# Patient Record
Sex: FEMALE | Race: WHITE | NOT HISPANIC OR LATINO | Employment: FULL TIME | URBAN - METROPOLITAN AREA
[De-identification: names, ages, dates, MRNs, and addresses within clinical notes are randomized per-mention and may not be internally consistent; named-entity substitution may affect disease eponyms.]

---

## 2023-04-11 ENCOUNTER — OFFICE VISIT (OUTPATIENT)
Dept: ENDOCRINOLOGY | Facility: CLINIC | Age: 28
End: 2023-04-11
Payer: COMMERCIAL

## 2023-04-11 VITALS
DIASTOLIC BLOOD PRESSURE: 66 MMHG | OXYGEN SATURATION: 98 % | HEART RATE: 78 BPM | SYSTOLIC BLOOD PRESSURE: 108 MMHG | HEIGHT: 66 IN | BODY MASS INDEX: 21.21 KG/M2 | WEIGHT: 132 LBS

## 2023-04-11 DIAGNOSIS — E06.3 HYPOTHYROIDISM DUE TO HASHIMOTO'S THYROIDITIS: Primary | ICD-10-CM

## 2023-04-11 DIAGNOSIS — E04.0 SIMPLE GOITER: ICD-10-CM

## 2023-04-11 DIAGNOSIS — E03.8 HYPOTHYROIDISM DUE TO HASHIMOTO'S THYROIDITIS: Primary | ICD-10-CM

## 2023-04-11 PROCEDURE — 99203 OFFICE O/P NEW LOW 30 MIN: CPT | Performed by: INTERNAL MEDICINE

## 2023-04-11 PROCEDURE — 84443 ASSAY THYROID STIM HORMONE: CPT | Performed by: INTERNAL MEDICINE

## 2023-04-11 PROCEDURE — 84439 ASSAY OF FREE THYROXINE: CPT | Performed by: INTERNAL MEDICINE

## 2023-04-11 RX ORDER — DEXTROAMPHETAMINE SACCHARATE, AMPHETAMINE ASPARTATE, DEXTROAMPHETAMINE SULFATE AND AMPHETAMINE SULFATE 2.5; 2.5; 2.5; 2.5 MG/1; MG/1; MG/1; MG/1
TABLET ORAL
COMMUNITY
Start: 2020-11-04

## 2023-04-11 RX ORDER — DIPHENOXYLATE HYDROCHLORIDE AND ATROPINE SULFATE 2.5; .025 MG/1; MG/1
TABLET ORAL DAILY
COMMUNITY

## 2023-04-11 NOTE — LETTER
April 23, 2023     Quentin Sullivan MD  [address] [facility] [address]  formerly [facility] [address]    Patient: Brandi Briseno   YOB: 1995   Date of Visit: 4/11/2023       Dear Dr. Nate MD:    Thank you for referring Brandi Briseno to me for evaluation. Below are the relevant portions of my assessment and plan of care.    If you have questions, please do not hesitate to call me. I look forward to following Brandi along with you.         Sincerely,        Keren Morris MD        CC: No Recipients    Keren Morris MD  04/23/23 0151  Sign when Signing Visit  Chief Complaint   Patient presents with   • Hypothyroidism     New Consult DIONISIO Sullivan       HPI:   Brandi Briseno is a 27 y.o.female sent in consultation by Quentin Sullivan MD for further evaluation of pt's hypothyroidism. Her history is as follows:    1) hypothyroidism due to Hashimoto's thyroiditis:  - Pt had a routine physical in 12/2022 and was found to have an elevated TSH of 8.27 (0.38 - 4.31) She was prescribed levothyroxine 75 mcg daily by her PCP but the patient did not start the medication. Pt's sister is followed by me for a h/o transient Graves disease and advised pt to see me in consultation for further evaluation.  - Pt denied any URI or other illness in the months prior to her abnormal TSH in 12/2022.    On further review, she reported occasional fatigue. Denied arthralgias or myalgias.  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  Is status post cholecystectomy in high school.    Review of Systems   Constitutional: Positive for fatigue (Occasional).   HENT: Negative.    Eyes: Negative.    Respiratory: Negative.    Cardiovascular: Negative.    Gastrointestinal: Positive for constipation (Occasional) and diarrhea (More frequent due to IBS).   Endocrine: Negative.    Genitourinary: Negative.    Musculoskeletal: Negative.  Negative for arthralgias, joint swelling and myalgias.   Skin: Negative.    Allergic/Immunologic: Negative.    Neurological:  "Negative.    Hematological: Negative.    Psychiatric/Behavioral: Negative.      Past Medical History:   Diagnosis Date   • ADD (attention deficit disorder)    • Hypothyroid      family history includes Cancer in her paternal aunt; Hypertension in her father; Thyroid disease in her maternal aunt, mother, and sister.  Past Surgical History:   Procedure Laterality Date   • CHOLECYSTECTOMY     • TONSILLECTOMY AND ADENOIDECTOMY     • WISDOM TOOTH EXTRACTION       Social History     Tobacco Use   • Smoking status: Never   • Smokeless tobacco: Never   Substance Use Topics   • Alcohol use: Yes     Comment: 1-2 weekly   • Drug use: Never     Outpatient Medications Prior to Visit   Medication Sig Dispense Refill   • amphetamine-dextroamphetamine (ADDERALL) 10 MG tablet      • Etonogestrel-Ethinyl Estradiol (NUVARING VA)      • multivitamin (MULTI-DAY PO) Take  by mouth Daily.       No facility-administered medications prior to visit.     No Known Allergies    /66   Pulse 78   Ht 166.4 cm (65.5\")   Wt 59.9 kg (132 lb)   SpO2 98%   BMI 21.63 kg/m²   Physical Exam  Vitals reviewed.   Constitutional:       General: She is not in acute distress.     Appearance: She is well-developed. She is not diaphoretic.   HENT:      Head: Normocephalic.   Eyes:      Conjunctiva/sclera: Conjunctivae normal.      Pupils: Pupils are equal, round, and reactive to light.   Neck:      Thyroid: Thyromegaly (mild) present.      Trachea: No tracheal deviation.      Comments: No palpable thyroid nodules    Cardiovascular:      Rate and Rhythm: Normal rate and regular rhythm.      Heart sounds: Normal heart sounds. No murmur heard.  Pulmonary:      Effort: Pulmonary effort is normal. No respiratory distress.      Breath sounds: Normal breath sounds.   Lymphadenopathy:      Cervical: No cervical adenopathy.   Skin:     General: Skin is warm and dry.      Findings: No erythema.   Neurological:      Mental Status: She is alert and oriented to " person, place, and time.      Cranial Nerves: No cranial nerve deficit.   Psychiatric:         Behavior: Behavior normal.         LABS/IMAGING: outside records reviewed and summarized in HPI  Results for orders placed or performed in visit on 04/11/23   TSH    Specimen: Arm, Left; Blood   Result Value Ref Range    TSH 6.880 (H) 0.270 - 4.200 uIU/mL   T4, Free    Specimen: Arm, Left; Blood   Result Value Ref Range    Free T4 1.36 0.93 - 1.70 ng/dL       ASSESSMENT/PLAN:  1) hypothyroidism due to Hashimoto's thyroiditis:  -Patient was clinically euthyroid on exam.  -Her TSH today was mildly elevated at 6.800 with a normal free T4 of 1.36  -I briefly looked at her gland with ultrasound in clinic which showed a mildly enlarged, hypoechoic, diffusely heterogeneous gland with increased vascularity and pseudonodules.  These findings are consistent with classic ultrasound images seen in Hashimoto's thyroiditis.  -Given her ultrasound images, mildly elevated TSH and simple goiter on physical exam, recommended that she start levothyroxine.   -Advised patient she could start taking the levothyroxine 75 mcg prescription from her PCP.   -Reviewed proper thyroid hormone administration, and factors to avoid that decrease medication potency and medication absorption.   - pt to have her TSH checked in 2 months. Will adjust dose as indicated.    2) simple goiter:  - I briefly looked at her gland with ultrasound in clinic which showed a mildly enlarged, hypoechoic, diffusely heterogeneous gland with increased vascularity and pseudonodules.  These findings are consistent with classic ultrasound images seen in Hashimoto's thyroiditis.  - Will complete a formal Thyroid US at a later date if concerning changes in her goiter are noted on physical exam.    RTC 6 months    Signed: Keren Morris MD

## 2023-04-12 LAB
T4 FREE SERPL-MCNC: 1.36 NG/DL (ref 0.93–1.7)
TSH SERPL DL<=0.05 MIU/L-ACNC: 6.88 UIU/ML (ref 0.27–4.2)

## 2023-04-17 PROBLEM — E06.3 HYPOTHYROIDISM DUE TO HASHIMOTO'S THYROIDITIS: Status: ACTIVE | Noted: 2023-04-17

## 2023-04-17 PROBLEM — E04.0 SIMPLE GOITER: Status: ACTIVE | Noted: 2023-04-17

## 2023-04-17 PROBLEM — E03.8 HYPOTHYROIDISM DUE TO HASHIMOTO'S THYROIDITIS: Status: ACTIVE | Noted: 2023-04-17

## 2023-04-17 RX ORDER — LEVOTHYROXINE SODIUM 0.07 MG/1
75 TABLET ORAL EVERY MORNING
Qty: 30 TABLET | Refills: 5
Start: 2023-04-17

## 2023-04-17 NOTE — PROGRESS NOTES
Chief Complaint   Patient presents with   • Hypothyroidism     New Consult DIONISIO Sullivan       HPI:   Brandi Briseno is a 27 y.o.female sent in consultation by Quentin Sullivan MD for further evaluation of pt's hypothyroidism. Her history is as follows:    1) hypothyroidism due to Hashimoto's thyroiditis:  - Pt had a routine physical in 12/2022 and was found to have an elevated TSH of 8.27 (0.38 - 4.31) She was prescribed levothyroxine 75 mcg daily by her PCP but the patient did not start the medication. Pt's sister is followed by me for a h/o transient Graves disease and advised pt to see me in consultation for further evaluation.  - Pt denied any URI or other illness in the months prior to her abnormal TSH in 12/2022.    On further review, she reported occasional fatigue. Denied arthralgias or myalgias.  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  Is status post cholecystectomy in high school.    Review of Systems   Constitutional: Positive for fatigue (Occasional).   HENT: Negative.    Eyes: Negative.    Respiratory: Negative.    Cardiovascular: Negative.    Gastrointestinal: Positive for constipation (Occasional) and diarrhea (More frequent due to IBS).   Endocrine: Negative.    Genitourinary: Negative.    Musculoskeletal: Negative.  Negative for arthralgias, joint swelling and myalgias.   Skin: Negative.    Allergic/Immunologic: Negative.    Neurological: Negative.    Hematological: Negative.    Psychiatric/Behavioral: Negative.      Past Medical History:   Diagnosis Date   • ADD (attention deficit disorder)    • Hypothyroid      family history includes Cancer in her paternal aunt; Hypertension in her father; Thyroid disease in her maternal aunt, mother, and sister.  Past Surgical History:   Procedure Laterality Date   • CHOLECYSTECTOMY     • TONSILLECTOMY AND ADENOIDECTOMY     • WISDOM TOOTH EXTRACTION       Social History     Tobacco Use   • Smoking status: Never   • Smokeless tobacco: Never   Substance Use  "Topics   • Alcohol use: Yes     Comment: 1-2 weekly   • Drug use: Never     Outpatient Medications Prior to Visit   Medication Sig Dispense Refill   • amphetamine-dextroamphetamine (ADDERALL) 10 MG tablet      • Etonogestrel-Ethinyl Estradiol (NUVARING VA)      • multivitamin (MULTI-DAY PO) Take  by mouth Daily.       No facility-administered medications prior to visit.     No Known Allergies    /66   Pulse 78   Ht 166.4 cm (65.5\")   Wt 59.9 kg (132 lb)   SpO2 98%   BMI 21.63 kg/m²   Physical Exam  Vitals reviewed.   Constitutional:       General: She is not in acute distress.     Appearance: She is well-developed. She is not diaphoretic.   HENT:      Head: Normocephalic.   Eyes:      Conjunctiva/sclera: Conjunctivae normal.      Pupils: Pupils are equal, round, and reactive to light.   Neck:      Thyroid: Thyromegaly (mild) present.      Trachea: No tracheal deviation.      Comments: No palpable thyroid nodules    Cardiovascular:      Rate and Rhythm: Normal rate and regular rhythm.      Heart sounds: Normal heart sounds. No murmur heard.  Pulmonary:      Effort: Pulmonary effort is normal. No respiratory distress.      Breath sounds: Normal breath sounds.   Lymphadenopathy:      Cervical: No cervical adenopathy.   Skin:     General: Skin is warm and dry.      Findings: No erythema.   Neurological:      Mental Status: She is alert and oriented to person, place, and time.      Cranial Nerves: No cranial nerve deficit.   Psychiatric:         Behavior: Behavior normal.         LABS/IMAGING: outside records reviewed and summarized in HPI  Results for orders placed or performed in visit on 04/11/23   TSH    Specimen: Arm, Left; Blood   Result Value Ref Range    TSH 6.880 (H) 0.270 - 4.200 uIU/mL   T4, Free    Specimen: Arm, Left; Blood   Result Value Ref Range    Free T4 1.36 0.93 - 1.70 ng/dL       ASSESSMENT/PLAN:  1) hypothyroidism due to Hashimoto's thyroiditis:  -Patient was clinically euthyroid on " exam.  -Her TSH today was mildly elevated at 6.800 with a normal free T4 of 1.36  -I briefly looked at her gland with ultrasound in clinic which showed a mildly enlarged, hypoechoic, diffusely heterogeneous gland with increased vascularity and pseudonodules.  These findings are consistent with classic ultrasound images seen in Hashimoto's thyroiditis.  -Given her ultrasound images, mildly elevated TSH and simple goiter on physical exam, recommended that she start levothyroxine.   -Advised patient she could start taking the levothyroxine 75 mcg prescription from her PCP.   -Reviewed proper thyroid hormone administration, and factors to avoid that decrease medication potency and medication absorption.   - pt to have her TSH checked in 2 months. Will adjust dose as indicated.    2) simple goiter:  - I briefly looked at her gland with ultrasound in clinic which showed a mildly enlarged, hypoechoic, diffusely heterogeneous gland with increased vascularity and pseudonodules.  These findings are consistent with classic ultrasound images seen in Hashimoto's thyroiditis.  - Will complete a formal Thyroid US at a later date if concerning changes in her goiter are noted on physical exam.    RTC 6 months    Signed: Keren Morris MD

## 2023-06-05 ENCOUNTER — LAB (OUTPATIENT)
Dept: LAB | Facility: HOSPITAL | Age: 28
End: 2023-06-05
Payer: COMMERCIAL

## 2023-06-05 DIAGNOSIS — E03.8 HYPOTHYROIDISM DUE TO HASHIMOTO'S THYROIDITIS: ICD-10-CM

## 2023-06-05 DIAGNOSIS — E06.3 HYPOTHYROIDISM DUE TO HASHIMOTO'S THYROIDITIS: ICD-10-CM

## 2023-06-05 LAB — TSH SERPL DL<=0.05 MIU/L-ACNC: 2.8 UIU/ML (ref 0.27–4.2)

## 2023-06-05 PROCEDURE — 84443 ASSAY THYROID STIM HORMONE: CPT

## 2023-06-06 ENCOUNTER — TRANSCRIBE ORDERS (OUTPATIENT)
Dept: ADMINISTRATIVE | Facility: HOSPITAL | Age: 28
End: 2023-06-06
Payer: COMMERCIAL

## 2023-06-06 DIAGNOSIS — R13.10 PAIN WITH SWALLOWING: Primary | ICD-10-CM

## 2023-10-16 ENCOUNTER — OFFICE VISIT (OUTPATIENT)
Dept: ENDOCRINOLOGY | Facility: CLINIC | Age: 28
End: 2023-10-16
Payer: COMMERCIAL

## 2023-10-16 VITALS
HEIGHT: 66 IN | WEIGHT: 136 LBS | OXYGEN SATURATION: 98 % | BODY MASS INDEX: 21.86 KG/M2 | DIASTOLIC BLOOD PRESSURE: 68 MMHG | SYSTOLIC BLOOD PRESSURE: 110 MMHG | HEART RATE: 73 BPM

## 2023-10-16 DIAGNOSIS — E06.3 HYPOTHYROIDISM DUE TO HASHIMOTO'S THYROIDITIS: Primary | ICD-10-CM

## 2023-10-16 DIAGNOSIS — E03.8 HYPOTHYROIDISM DUE TO HASHIMOTO'S THYROIDITIS: Primary | ICD-10-CM

## 2023-10-16 DIAGNOSIS — E04.0 SIMPLE GOITER: ICD-10-CM

## 2023-10-16 LAB — TSH SERPL DL<=0.05 MIU/L-ACNC: 3.06 UIU/ML (ref 0.27–4.2)

## 2023-10-16 PROCEDURE — 36415 COLL VENOUS BLD VENIPUNCTURE: CPT | Performed by: INTERNAL MEDICINE

## 2023-10-16 PROCEDURE — 99213 OFFICE O/P EST LOW 20 MIN: CPT | Performed by: INTERNAL MEDICINE

## 2023-10-16 PROCEDURE — 84443 ASSAY THYROID STIM HORMONE: CPT | Performed by: INTERNAL MEDICINE

## 2023-10-16 NOTE — PROGRESS NOTES
Chief Complaint   Patient presents with    Hypothyroidism     Follow up        HPI:   Brandi Briseno is a 28 y.o.female who returns to endocrine clinic for follow-up evaluation of her hypothyroidism.  Last visit 4/11/2023. Her history is as follows:    Interim Events:   -Patient overall feeling well  -She has been filling her prescription through her PCPs office    1) hypothyroidism due to Hashimoto's thyroiditis:  - Pt had a routine physical in 12/2022 and was found to have an elevated TSH of 8.27 (0.38 - 4.31) She was prescribed levothyroxine 75 mcg daily by her PCP but the patient did not start the medication. Pt's sister is followed by me for a h/o transient Graves disease and advised pt to see me in consultation for further evaluation.  - Pt denied any URI or other illness in the months prior to her abnormal TSH in 12/2022.    Initial Endocrine Visit (04/11/2023)   Her TSH was mildly elevated at 6.800 with a normal free T4 of 1.36. I briefly looked at her gland with ultrasound in clinic which showed a mildly enlarged, hypoechoic, diffusely heterogeneous gland with increased vascularity and pseudonodules.  These findings are consistent with classic ultrasound images seen in Hashimoto's thyroiditis. Given her ultrasound images, mildly elevated TSH and simple goiter on physical exam, recommended that she start levothyroxine. Advised patient she could start taking the levothyroxine 75 mcg prescription from her PCP.     Current Rx: levothyroxine 75 mcg qAM  - Takes on an empty stomach.  Waits at least 30 minutes before food or hot liquids  - No interfering factors  - No missed doses  - Is not on a high dose biotin supplement    Review of Systems   Constitutional:  Negative for fatigue.        Weight stable   HENT: Negative.     Eyes: Negative.    Respiratory: Negative.     Cardiovascular: Negative.    Gastrointestinal:  Positive for constipation (Occasional) and diarrhea (More frequent due to IBS).   Endocrine:  "Negative.    Genitourinary: Negative.    Musculoskeletal: Negative.  Negative for arthralgias, joint swelling and myalgias.   Skin: Negative.    Allergic/Immunologic: Negative.    Neurological: Negative.    Hematological: Negative.    Psychiatric/Behavioral: Negative.       The following portions of the patient's history were reviewed and updated as appropriate: allergies, current medications, past family history, past medical history, past social history, past surgical history and problem list.    /68   Pulse 73   Ht 166.4 cm (65.5\")   Wt 61.7 kg (136 lb)   SpO2 98%   BMI 22.29 kg/m²   Physical Exam  Vitals reviewed.   Constitutional:       General: She is not in acute distress.     Appearance: She is well-developed. She is not diaphoretic.   HENT:      Head: Normocephalic.   Eyes:      Conjunctiva/sclera: Conjunctivae normal.      Pupils: Pupils are equal, round, and reactive to light.   Neck:      Thyroid: Thyromegaly (mild) present.      Trachea: No tracheal deviation.      Comments: No palpable thyroid nodules    Cardiovascular:      Rate and Rhythm: Normal rate and regular rhythm.      Heart sounds: Normal heart sounds. No murmur heard.  Pulmonary:      Effort: Pulmonary effort is normal. No respiratory distress.      Breath sounds: Normal breath sounds.   Lymphadenopathy:      Cervical: No cervical adenopathy.   Skin:     General: Skin is warm and dry.      Findings: No erythema.   Neurological:      Mental Status: She is alert and oriented to person, place, and time.      Cranial Nerves: No cranial nerve deficit.   Psychiatric:         Behavior: Behavior normal.       LABS/IMAGING: outside records reviewed and summarized in HPI  Results for orders placed or performed in visit on 10/16/23   TSH    Specimen: Arm, Left; Blood   Result Value Ref Range    TSH 3.060 0.270 - 4.200 uIU/mL       ASSESSMENT/PLAN:  1) hypothyroidism due to Hashimoto's thyroiditis:  -Patient was clinically euthyroid on " exam.  -TSH today was normal at 3.060.   - Pt to continue the levothyroxine at 75 mcg every morning.  She has been receiving the levothyroxine through her PCPs office.  Advised patient can continue to receive her levothyroxine prescription through her PCPs office.  -Reviewed proper thyroid hormone administration, and factors to avoid that decrease medication potency and medication absorption.     2) simple goiter:  - At her initial visit in 04/2023, I briefly looked at her gland with ultrasound in clinic which showed a mildly enlarged, hypoechoic, diffusely heterogeneous gland with increased vascularity and pseudonodules.  These findings are consistent with classic ultrasound images seen in Hashimoto's thyroiditis.  - Will complete a formal Thyroid US at a later date if concerning changes in her goiter are noted on physical exam.    RTC 12 months    Signed: Keren Morris MD

## 2023-12-07 ENCOUNTER — TELEPHONE (OUTPATIENT)
Dept: ENDOCRINOLOGY | Facility: CLINIC | Age: 28
End: 2023-12-07
Payer: COMMERCIAL

## 2023-12-07 DIAGNOSIS — E03.8 HYPOTHYROIDISM DUE TO HASHIMOTO'S THYROIDITIS: Primary | ICD-10-CM

## 2023-12-07 DIAGNOSIS — E06.3 HYPOTHYROIDISM DUE TO HASHIMOTO'S THYROIDITIS: Primary | ICD-10-CM

## 2023-12-07 NOTE — TELEPHONE ENCOUNTER
PT CALLED STATING SHE HAD LABS DRAWN 12/04/23 W/ PCP. LABS IN CHART. SHE REQUESTED A CALL BACK TO CONSULT.

## 2023-12-07 NOTE — TELEPHONE ENCOUNTER
Spoke with patient about recent labs collected on 12/04/023 at her PCP's office. Pt was ill with a GI illness at the time. TSH was 6.41, free T4 1.22 at that time. Will have pt continue the levothyroxine 75 mcg daily at this time and repeat TFT's in 2 months. If TSH again elevated, will increase her levothyroxine dose to 88 mcg.   Electronically Signed: Keren Morris MD

## 2024-02-02 ENCOUNTER — LAB (OUTPATIENT)
Dept: LAB | Facility: HOSPITAL | Age: 29
End: 2024-02-02
Payer: COMMERCIAL

## 2024-02-02 DIAGNOSIS — E06.3 HYPOTHYROIDISM DUE TO HASHIMOTO'S THYROIDITIS: ICD-10-CM

## 2024-02-02 DIAGNOSIS — E03.8 HYPOTHYROIDISM DUE TO HASHIMOTO'S THYROIDITIS: ICD-10-CM

## 2024-02-02 LAB
T4 FREE SERPL-MCNC: 1.53 NG/DL (ref 0.93–1.7)
TSH SERPL DL<=0.05 MIU/L-ACNC: 7.12 UIU/ML (ref 0.27–4.2)

## 2024-02-02 PROCEDURE — 84443 ASSAY THYROID STIM HORMONE: CPT

## 2024-02-02 PROCEDURE — 84439 ASSAY OF FREE THYROXINE: CPT

## 2024-02-05 DIAGNOSIS — E03.8 HYPOTHYROIDISM DUE TO HASHIMOTO'S THYROIDITIS: Primary | ICD-10-CM

## 2024-02-05 DIAGNOSIS — E06.3 HYPOTHYROIDISM DUE TO HASHIMOTO'S THYROIDITIS: Primary | ICD-10-CM

## 2024-02-05 RX ORDER — LEVOTHYROXINE SODIUM 0.1 MG/1
100 TABLET ORAL EVERY MORNING
Qty: 90 TABLET | Refills: 1 | Status: SHIPPED | OUTPATIENT
Start: 2024-02-05

## 2024-04-05 ENCOUNTER — LAB (OUTPATIENT)
Dept: LAB | Facility: HOSPITAL | Age: 29
End: 2024-04-05
Payer: COMMERCIAL

## 2024-04-05 DIAGNOSIS — E03.8 HYPOTHYROIDISM DUE TO HASHIMOTO'S THYROIDITIS: ICD-10-CM

## 2024-04-05 DIAGNOSIS — E06.3 HYPOTHYROIDISM DUE TO HASHIMOTO'S THYROIDITIS: ICD-10-CM

## 2024-04-05 LAB — TSH SERPL DL<=0.05 MIU/L-ACNC: 3.31 UIU/ML (ref 0.27–4.2)

## 2024-04-05 PROCEDURE — 84443 ASSAY THYROID STIM HORMONE: CPT

## 2024-07-22 DIAGNOSIS — E06.3 HYPOTHYROIDISM DUE TO HASHIMOTO'S THYROIDITIS: ICD-10-CM

## 2024-07-22 DIAGNOSIS — E03.8 HYPOTHYROIDISM DUE TO HASHIMOTO'S THYROIDITIS: ICD-10-CM

## 2024-07-22 RX ORDER — LEVOTHYROXINE SODIUM 0.1 MG/1
100 TABLET ORAL EVERY MORNING
Qty: 30 TABLET | Refills: 2 | Status: SHIPPED | OUTPATIENT
Start: 2024-07-22

## 2024-07-22 NOTE — TELEPHONE ENCOUNTER
Rx Refill Note    Requested Prescriptions     Pending Prescriptions Disp Refills    levothyroxine (SYNTHROID, LEVOTHROID) 100 MCG tablet [Pharmacy Med Name: LEVOTHYROXINE 100 MCG TABLET] 30 tablet 5     Sig: TAKE 1 TABLET BY MOUTH EVERY DAY IN THE MORNING        Last office visit with prescribing clinician: 10/16/2023       Next office visit with prescribing clinician: 10/21/2024

## 2024-08-02 ENCOUNTER — TELEPHONE (OUTPATIENT)
Dept: ENDOCRINOLOGY | Facility: CLINIC | Age: 29
End: 2024-08-02
Payer: COMMERCIAL

## 2024-08-02 DIAGNOSIS — E06.3 HYPOTHYROIDISM DUE TO HASHIMOTO'S THYROIDITIS: Primary | ICD-10-CM

## 2024-08-02 DIAGNOSIS — E03.8 HYPOTHYROIDISM DUE TO HASHIMOTO'S THYROIDITIS: Primary | ICD-10-CM

## 2024-08-02 NOTE — TELEPHONE ENCOUNTER
PATIENT IS CALLING STATING SHE IS CONCERNED ABOUT WEIGHT GAIN AND IF IT COULD BE A SIDE AFFECT OF TAKING LEVOTHYROXINE OR IF SHE NEEDS TO HAVE HER THYROID LEVELS CHECKED AGAIN TO SEE WHAT IS GOING ON. PHONE NUMBER [phone]

## 2024-08-02 NOTE — TELEPHONE ENCOUNTER
Spoke to patient about her recent symptoms. Will have her complete her TFTs on Monday 08/05/2024 and adjust her levothyroxine as indicated.  Electronically Signed: Keren Morris MD

## 2024-08-05 ENCOUNTER — LAB (OUTPATIENT)
Dept: LAB | Facility: HOSPITAL | Age: 29
End: 2024-08-05
Payer: COMMERCIAL

## 2024-08-05 DIAGNOSIS — E06.3 HYPOTHYROIDISM DUE TO HASHIMOTO'S THYROIDITIS: Primary | ICD-10-CM

## 2024-08-05 DIAGNOSIS — E03.8 HYPOTHYROIDISM DUE TO HASHIMOTO'S THYROIDITIS: ICD-10-CM

## 2024-08-05 DIAGNOSIS — E03.8 HYPOTHYROIDISM DUE TO HASHIMOTO'S THYROIDITIS: Primary | ICD-10-CM

## 2024-08-05 DIAGNOSIS — E06.3 HYPOTHYROIDISM DUE TO HASHIMOTO'S THYROIDITIS: ICD-10-CM

## 2024-08-05 LAB
T4 FREE SERPL-MCNC: 1.44 NG/DL (ref 0.92–1.68)
TSH SERPL DL<=0.05 MIU/L-ACNC: 4.88 UIU/ML (ref 0.27–4.2)

## 2024-08-05 PROCEDURE — 84443 ASSAY THYROID STIM HORMONE: CPT

## 2024-08-05 PROCEDURE — 84439 ASSAY OF FREE THYROXINE: CPT

## 2024-08-05 RX ORDER — LEVOTHYROXINE SODIUM 0.12 MG/1
125 TABLET ORAL EVERY MORNING
Qty: 90 TABLET | Refills: 1 | Status: SHIPPED | OUTPATIENT
Start: 2024-08-05

## 2024-08-05 NOTE — PROGRESS NOTES
Spoke to patient about lab results. Increased Levothyroxine to 125 mcg qAM.   Electronically Signed: Keren Morris MD

## 2024-10-21 ENCOUNTER — OFFICE VISIT (OUTPATIENT)
Dept: ENDOCRINOLOGY | Facility: CLINIC | Age: 29
End: 2024-10-21
Payer: COMMERCIAL

## 2024-10-21 VITALS
WEIGHT: 148.6 LBS | HEART RATE: 72 BPM | BODY MASS INDEX: 24.35 KG/M2 | OXYGEN SATURATION: 98 % | DIASTOLIC BLOOD PRESSURE: 70 MMHG | SYSTOLIC BLOOD PRESSURE: 110 MMHG

## 2024-10-21 DIAGNOSIS — E06.3 HYPOTHYROIDISM DUE TO HASHIMOTO'S THYROIDITIS: Primary | ICD-10-CM

## 2024-10-21 DIAGNOSIS — E04.0 SIMPLE GOITER: ICD-10-CM

## 2024-10-21 LAB
T4 FREE SERPL-MCNC: 1.66 NG/DL (ref 0.92–1.68)
TSH SERPL DL<=0.05 MIU/L-ACNC: 2.52 UIU/ML (ref 0.27–4.2)

## 2024-10-21 PROCEDURE — 84443 ASSAY THYROID STIM HORMONE: CPT | Performed by: INTERNAL MEDICINE

## 2024-10-21 PROCEDURE — 36415 COLL VENOUS BLD VENIPUNCTURE: CPT | Performed by: INTERNAL MEDICINE

## 2024-10-21 PROCEDURE — 84439 ASSAY OF FREE THYROXINE: CPT | Performed by: INTERNAL MEDICINE

## 2024-10-21 PROCEDURE — 99214 OFFICE O/P EST MOD 30 MIN: CPT | Performed by: INTERNAL MEDICINE

## 2024-10-21 RX ORDER — LEVOTHYROXINE SODIUM 137 UG/1
137 TABLET ORAL EVERY MORNING
Qty: 30 TABLET | Refills: 5 | Status: SHIPPED | OUTPATIENT
Start: 2024-10-21

## 2024-10-31 ENCOUNTER — TELEPHONE (OUTPATIENT)
Dept: ENDOCRINOLOGY | Facility: CLINIC | Age: 29
End: 2024-10-31
Payer: COMMERCIAL

## 2024-10-31 NOTE — TELEPHONE ENCOUNTER
Pt states the pharmacy recently made her aware of a possible interaction in medications, wanted to speak with Morris or clinical about this, would like call back

## 2024-10-31 NOTE — TELEPHONE ENCOUNTER
Spoke with patient about the potential drug interaction of her Nuvaring and Levothyroxine. Her TSH has been monitored and levothyroxine doses have been adjusted as indicated.   Electronically Signed: Keren Morris MD

## 2024-10-31 NOTE — TELEPHONE ENCOUNTER
Called the pt and she stated in the past few months her Levothyroxine has been changed. She went to the phar and they told her that the Levo could interfere with her Nuvaring being less effective.    Please advise.

## 2024-12-10 ENCOUNTER — LAB (OUTPATIENT)
Dept: LAB | Facility: HOSPITAL | Age: 29
End: 2024-12-10
Payer: COMMERCIAL

## 2024-12-10 DIAGNOSIS — E06.3 HYPOTHYROIDISM DUE TO HASHIMOTO'S THYROIDITIS: ICD-10-CM

## 2024-12-10 LAB
T4 FREE SERPL-MCNC: 1.71 NG/DL (ref 0.92–1.68)
TSH SERPL DL<=0.05 MIU/L-ACNC: 2.29 UIU/ML (ref 0.27–4.2)

## 2024-12-10 PROCEDURE — 84439 ASSAY OF FREE THYROXINE: CPT

## 2024-12-10 PROCEDURE — 84443 ASSAY THYROID STIM HORMONE: CPT

## 2025-04-19 DIAGNOSIS — E06.3 HYPOTHYROIDISM DUE TO HASHIMOTO'S THYROIDITIS: ICD-10-CM

## 2025-04-21 RX ORDER — LEVOTHYROXINE SODIUM 137 UG/1
137 TABLET ORAL EVERY MORNING
Qty: 30 TABLET | Refills: 0 | Status: SHIPPED | OUTPATIENT
Start: 2025-04-21

## 2025-04-21 NOTE — TELEPHONE ENCOUNTER
Rx Refill Note  Requested Prescriptions     Pending Prescriptions Disp Refills    levothyroxine (SYNTHROID, LEVOTHROID) 137 MCG tablet [Pharmacy Med Name: LEVOTHYROXINE 137 MCG TABLET] 30 tablet 5     Sig: TAKE 1 TABLET BY MOUTH EVERY MORNING. NEW DOSE, PLEASE D/C PRIOR RXS  MC[facility]      Last office visit with prescribing clinician: 10/21/2024   Last telemedicine visit with prescribing clinician: Visit date not found   Next office visit with prescribing clinician: 4/29/2025       Sol Arboleda MA  04/21/25, 08:31 EDT

## 2025-04-29 ENCOUNTER — OFFICE VISIT (OUTPATIENT)
Dept: ENDOCRINOLOGY | Facility: CLINIC | Age: 30
End: 2025-04-29
Payer: COMMERCIAL

## 2025-04-29 VITALS
HEIGHT: 65 IN | WEIGHT: 151.2 LBS | HEART RATE: 77 BPM | DIASTOLIC BLOOD PRESSURE: 73 MMHG | SYSTOLIC BLOOD PRESSURE: 112 MMHG | OXYGEN SATURATION: 97 % | BODY MASS INDEX: 25.19 KG/M2

## 2025-04-29 DIAGNOSIS — E06.3 HYPOTHYROIDISM DUE TO HASHIMOTO'S THYROIDITIS: Primary | ICD-10-CM

## 2025-04-29 DIAGNOSIS — E04.0 SIMPLE GOITER: ICD-10-CM

## 2025-04-29 LAB
T4 FREE SERPL-MCNC: 1.86 NG/DL (ref 0.92–1.68)
TSH SERPL DL<=0.05 MIU/L-ACNC: 1.83 UIU/ML (ref 0.27–4.2)

## 2025-04-29 PROCEDURE — 36415 COLL VENOUS BLD VENIPUNCTURE: CPT | Performed by: INTERNAL MEDICINE

## 2025-04-29 PROCEDURE — 99213 OFFICE O/P EST LOW 20 MIN: CPT | Performed by: INTERNAL MEDICINE

## 2025-04-29 PROCEDURE — 84443 ASSAY THYROID STIM HORMONE: CPT | Performed by: INTERNAL MEDICINE

## 2025-04-29 PROCEDURE — 84439 ASSAY OF FREE THYROXINE: CPT | Performed by: INTERNAL MEDICINE

## 2025-04-29 RX ORDER — LEVOTHYROXINE SODIUM 137 UG/1
137 TABLET ORAL EVERY MORNING
Qty: 90 TABLET | Refills: 1 | Status: SHIPPED | OUTPATIENT
Start: 2025-04-29

## 2025-04-29 NOTE — PROGRESS NOTES
Chief Complaint   Patient presents with    Hypothyroidism due to Hashimoto's thyroiditis     Follow-up       HPI:   Brandi Briseno is a 29 y.o.female who returns to endocrine clinic for follow-up evaluation of her hypothyroidism.  Last visit 10/21/2024. Her history is as follows:    Interim Events:   - pt stopped her NuvaRing OCP last week to see if she notes any improvement in her symptoms.   - She is taking a prenatal vitamin at lunch time, magnesium in the evening  - Is sleeping 7-8 hours / night  - Reports more fatigue over the past 3-4 months. Reports having to take a short nap in the afternoon.     1) hypothyroidism due to Hashimoto's thyroiditis:  - Pt had a routine physical in 12/2022 and was found to have an elevated TSH of 8.27 (0.38 - 4.31) She was prescribed levothyroxine 75 mcg daily by her PCP but the patient did not start the medication. Pt's sister is followed by me for a h/o transient Graves disease and advised pt to see me in consultation for further evaluation.  - Pt denied any URI or other illness in the months prior to her abnormal TSH in 12/2022.    Initial Endocrine Visit (04/11/2023)   Her TSH was mildly elevated at 6.800 with a normal free T4 of 1.36. I briefly looked at her gland with ultrasound in clinic which showed a mildly enlarged, hypoechoic, diffusely heterogeneous gland with increased vascularity and pseudonodules.  These findings are consistent with classic ultrasound images seen in Hashimoto's thyroiditis. Given her ultrasound images, mildly elevated TSH and simple goiter on physical exam, recommended that she start levothyroxine. Advised patient she could start taking the levothyroxine 75 mcg prescription from her PCP.   - dose has been slowly titrated    Current Rx: levothyroxine 137 mcg qAM  - Takes on an empty stomach.  Waits at least 30 minutes before food or hot liquids  - No interfering factors  - No missed doses  - Is not on a high dose biotin supplement    Review of Systems  "  Constitutional:  Positive for fatigue.        Weight stable   HENT: Negative.     Eyes: Negative.    Respiratory: Negative.     Cardiovascular: Negative.    Gastrointestinal:  Positive for constipation (Occasional) and diarrhea (More frequent due to IBS).   Endocrine: Negative.    Genitourinary: Negative.    Musculoskeletal: Negative.  Negative for arthralgias, joint swelling and myalgias.   Skin: Negative.    Allergic/Immunologic: Negative.    Neurological: Negative.    Hematological: Negative.    Psychiatric/Behavioral: Negative.  Negative for sleep disturbance.      The following portions of the patient's history were reviewed and updated as appropriate: allergies, current medications, past family history, past medical history, past social history, past surgical history and problem list.    /73 (BP Location: Left arm, Patient Position: Sitting, Cuff Size: Adult)   Pulse 77   Ht 165.1 cm (65\")   Wt 68.6 kg (151 lb 3.2 oz)   SpO2 97%   BMI 25.16 kg/m²   Physical Exam  Vitals reviewed.   Constitutional:       General: She is not in acute distress.     Appearance: She is well-developed. She is not diaphoretic.   HENT:      Head: Normocephalic.   Eyes:      Conjunctiva/sclera: Conjunctivae normal.      Pupils: Pupils are equal, round, and reactive to light.   Neck:      Thyroid: Thyromegaly (mild) present.      Trachea: No tracheal deviation.      Comments: No palpable thyroid nodules    Cardiovascular:      Rate and Rhythm: Normal rate and regular rhythm.      Heart sounds: Normal heart sounds. No murmur heard.  Pulmonary:      Effort: Pulmonary effort is normal. No respiratory distress.      Breath sounds: Normal breath sounds.   Lymphadenopathy:      Cervical: No cervical adenopathy.   Skin:     General: Skin is warm and dry.      Findings: No erythema.   Neurological:      Mental Status: She is alert and oriented to person, place, and time.      Cranial Nerves: No cranial nerve deficit.   Psychiatric: "         Behavior: Behavior normal.       LABS/IMAGING: outside records reviewed and summarized in HPI  Results for orders placed or performed in visit on 04/29/25   TSH    Collection Time: 04/29/25  8:39 AM    Specimen: Arm, Left; Blood   Result Value Ref Range    TSH 1.830 0.270 - 4.200 uIU/mL   T4, Free    Collection Time: 04/29/25  8:39 AM    Specimen: Arm, Left; Blood   Result Value Ref Range    Free T4 1.86 (H) 0.92 - 1.68 ng/dL       ASSESSMENT/PLAN:  1) hypothyroidism due to Hashimoto's thyroiditis:  - Patient was clinically euthyroid on exam. Pt with increased fatigue recently despite adequate dose of thyroid hormone. Pt also just stopped her NuvaRing last week to see if she notes any improvement in her symptoms.   -TSH today was normal at 1.830   - Continuing levothyroxine 137 mcg qAM. (May consider trial of other formulations or addition of liothyronine if with persistent fatigue)  - Reviewed proper thyroid hormone administration, and factors to avoid that decrease medication potency and medication absorption.     2) simple goiter:  - stable on physical exam  - At her initial visit in 04/2023, I briefly looked at her gland with ultrasound in clinic which showed a mildly enlarged, hypoechoic, diffusely heterogeneous gland with increased vascularity and pseudonodules.  These findings are consistent with classic ultrasound images seen in Hashimoto's thyroiditis.  - Will complete a formal Thyroid US at a later date if concerning changes in her goiter are noted on physical exam.    RTC 6 months    Electronically Signed: Keren Morris MD